# Patient Record
Sex: MALE | Race: BLACK OR AFRICAN AMERICAN | NOT HISPANIC OR LATINO | Employment: FULL TIME | URBAN - METROPOLITAN AREA
[De-identification: names, ages, dates, MRNs, and addresses within clinical notes are randomized per-mention and may not be internally consistent; named-entity substitution may affect disease eponyms.]

---

## 2024-02-08 ENCOUNTER — OFFICE VISIT (OUTPATIENT)
Dept: PRIMARY CARE | Facility: CLINIC | Age: 29
End: 2024-02-08
Payer: COMMERCIAL

## 2024-02-08 VITALS
DIASTOLIC BLOOD PRESSURE: 87 MMHG | BODY MASS INDEX: 39.52 KG/M2 | SYSTOLIC BLOOD PRESSURE: 127 MMHG | HEIGHT: 67 IN | OXYGEN SATURATION: 96 % | WEIGHT: 251.8 LBS | TEMPERATURE: 98.1 F | HEART RATE: 89 BPM

## 2024-02-08 DIAGNOSIS — Z11.59 NEED FOR HEPATITIS C SCREENING TEST: ICD-10-CM

## 2024-02-08 DIAGNOSIS — E78.2 HYPERLIPIDEMIA, MIXED: ICD-10-CM

## 2024-02-08 DIAGNOSIS — E55.9 HYPOVITAMINOSIS D: ICD-10-CM

## 2024-02-08 DIAGNOSIS — Z83.3 FAMILY HISTORY OF DIABETES MELLITUS: ICD-10-CM

## 2024-02-08 DIAGNOSIS — N52.9 ERECTILE DISORDER: ICD-10-CM

## 2024-02-08 DIAGNOSIS — Z00.00 HEALTHCARE MAINTENANCE: Primary | ICD-10-CM

## 2024-02-08 DIAGNOSIS — Z11.4 SCREENING FOR HIV (HUMAN IMMUNODEFICIENCY VIRUS): ICD-10-CM

## 2024-02-08 DIAGNOSIS — E66.09 CLASS 2 OBESITY DUE TO EXCESS CALORIES WITHOUT SERIOUS COMORBIDITY WITH BODY MASS INDEX (BMI) OF 39.0 TO 39.9 IN ADULT: ICD-10-CM

## 2024-02-08 PROBLEM — N46.11 OLIGOSPERMIA: Status: ACTIVE | Noted: 2022-01-18

## 2024-02-08 PROBLEM — R86.8 LOW VOLUME OF EJACULATED SEMEN: Status: ACTIVE | Noted: 2022-01-18

## 2024-02-08 LAB
25(OH)D3 SERPL-MCNC: 23 NG/ML (ref 30–100)
ALBUMIN SERPL BCP-MCNC: 4.3 G/DL (ref 3.4–5)
ALP SERPL-CCNC: 37 U/L (ref 33–120)
ALT SERPL W P-5'-P-CCNC: 25 U/L (ref 10–52)
ANION GAP SERPL CALC-SCNC: 11 MMOL/L (ref 10–20)
AST SERPL W P-5'-P-CCNC: 36 U/L (ref 9–39)
BILIRUB SERPL-MCNC: 0.6 MG/DL (ref 0–1.2)
BUN SERPL-MCNC: 13 MG/DL (ref 6–23)
CALCIUM SERPL-MCNC: 9.1 MG/DL (ref 8.6–10.6)
CHLORIDE SERPL-SCNC: 104 MMOL/L (ref 98–107)
CHOLEST SERPL-MCNC: 169 MG/DL (ref 0–199)
CHOLESTEROL/HDL RATIO: 5
CO2 SERPL-SCNC: 27 MMOL/L (ref 21–32)
CREAT SERPL-MCNC: 1.16 MG/DL (ref 0.5–1.3)
EGFRCR SERPLBLD CKD-EPI 2021: 88 ML/MIN/1.73M*2
EST. AVERAGE GLUCOSE BLD GHB EST-MCNC: 80 MG/DL
GLUCOSE SERPL-MCNC: 84 MG/DL (ref 74–99)
HBA1C MFR BLD: 4.4 %
HCV AB SER QL: NONREACTIVE
HDLC SERPL-MCNC: 33.5 MG/DL
HIV 1+2 AB+HIV1 P24 AG SERPL QL IA: NONREACTIVE
NON-HDL CHOLESTEROL: 136 MG/DL (ref 0–149)
POTASSIUM SERPL-SCNC: 4.2 MMOL/L (ref 3.5–5.3)
PROT SERPL-MCNC: 7.1 G/DL (ref 6.4–8.2)
SODIUM SERPL-SCNC: 138 MMOL/L (ref 136–145)

## 2024-02-08 PROCEDURE — 80053 COMPREHEN METABOLIC PANEL: CPT

## 2024-02-08 PROCEDURE — 83036 HEMOGLOBIN GLYCOSYLATED A1C: CPT

## 2024-02-08 PROCEDURE — 87389 HIV-1 AG W/HIV-1&-2 AB AG IA: CPT

## 2024-02-08 PROCEDURE — 86803 HEPATITIS C AB TEST: CPT

## 2024-02-08 PROCEDURE — 82465 ASSAY BLD/SERUM CHOLESTEROL: CPT

## 2024-02-08 PROCEDURE — 83718 ASSAY OF LIPOPROTEIN: CPT

## 2024-02-08 PROCEDURE — 99385 PREV VISIT NEW AGE 18-39: CPT | Performed by: INTERNAL MEDICINE

## 2024-02-08 PROCEDURE — 82306 VITAMIN D 25 HYDROXY: CPT

## 2024-02-08 PROCEDURE — 1036F TOBACCO NON-USER: CPT | Performed by: INTERNAL MEDICINE

## 2024-02-08 PROCEDURE — 3008F BODY MASS INDEX DOCD: CPT | Performed by: INTERNAL MEDICINE

## 2024-02-08 PROCEDURE — 99202 OFFICE O/P NEW SF 15 MIN: CPT | Performed by: INTERNAL MEDICINE

## 2024-02-08 PROCEDURE — 36415 COLL VENOUS BLD VENIPUNCTURE: CPT

## 2024-02-08 RX ORDER — TADALAFIL 10 MG/1
10 TABLET ORAL DAILY PRN
Qty: 30 TABLET | Refills: 6 | Status: SHIPPED | OUTPATIENT
Start: 2024-02-08 | End: 2025-02-07

## 2024-02-08 SDOH — ECONOMIC STABILITY: HOUSING INSECURITY
IN THE LAST 12 MONTHS, WAS THERE A TIME WHEN YOU DID NOT HAVE A STEADY PLACE TO SLEEP OR SLEPT IN A SHELTER (INCLUDING NOW)?: NO

## 2024-02-08 SDOH — ECONOMIC STABILITY: GENERAL
WHICH OF THE FOLLOWING DO YOU KNOW HOW TO USE AND HAVE ACCESS TO EVERY DAY? (CHOOSE ALL THAT APPLY): DESKTOP COMPUTER, LAPTOP COMPUTER, OR TABLET WITH BROADBAND INTERNET CONNECTION;SMARTPHONE WITH CELLULAR DATA PLAN

## 2024-02-08 SDOH — ECONOMIC STABILITY: FOOD INSECURITY: WITHIN THE PAST 12 MONTHS, THE FOOD YOU BOUGHT JUST DIDN'T LAST AND YOU DIDN'T HAVE MONEY TO GET MORE.: NEVER TRUE

## 2024-02-08 SDOH — ECONOMIC STABILITY: FOOD INSECURITY: WITHIN THE PAST 12 MONTHS, YOU WORRIED THAT YOUR FOOD WOULD RUN OUT BEFORE YOU GOT MONEY TO BUY MORE.: NEVER TRUE

## 2024-02-08 SDOH — ECONOMIC STABILITY: INCOME INSECURITY: IN THE LAST 12 MONTHS, WAS THERE A TIME WHEN YOU WERE NOT ABLE TO PAY THE MORTGAGE OR RENT ON TIME?: NO

## 2024-02-08 SDOH — HEALTH STABILITY: PHYSICAL HEALTH: ON AVERAGE, HOW MANY MINUTES DO YOU ENGAGE IN EXERCISE AT THIS LEVEL?: 60 MIN

## 2024-02-08 SDOH — HEALTH STABILITY: PHYSICAL HEALTH: ON AVERAGE, HOW MANY DAYS PER WEEK DO YOU ENGAGE IN MODERATE TO STRENUOUS EXERCISE (LIKE A BRISK WALK)?: 6 DAYS

## 2024-02-08 SDOH — ECONOMIC STABILITY: TRANSPORTATION INSECURITY
IN THE PAST 12 MONTHS, HAS THE LACK OF TRANSPORTATION KEPT YOU FROM MEDICAL APPOINTMENTS OR FROM GETTING MEDICATIONS?: NO

## 2024-02-08 SDOH — ECONOMIC STABILITY: TRANSPORTATION INSECURITY
IN THE PAST 12 MONTHS, HAS LACK OF TRANSPORTATION KEPT YOU FROM MEETINGS, WORK, OR FROM GETTING THINGS NEEDED FOR DAILY LIVING?: NO

## 2024-02-08 ASSESSMENT — ENCOUNTER SYMPTOMS
DEPRESSION: 0
NAUSEA: 0
ACTIVITY CHANGE: 0
SHORTNESS OF BREATH: 0
MYALGIAS: 0
PALPITATIONS: 0
OCCASIONAL FEELINGS OF UNSTEADINESS: 0
AGITATION: 0
LOSS OF SENSATION IN FEET: 0
CHEST TIGHTNESS: 0
WEAKNESS: 0
DIARRHEA: 0
SORE THROAT: 0
CHILLS: 0
SINUS PRESSURE: 0

## 2024-02-08 ASSESSMENT — SOCIAL DETERMINANTS OF HEALTH (SDOH)
WITHIN THE LAST YEAR, HAVE YOU BEEN KICKED, HIT, SLAPPED, OR OTHERWISE PHYSICALLY HURT BY YOUR PARTNER OR EX-PARTNER?: NO
IN THE PAST 12 MONTHS, HAS THE ELECTRIC, GAS, OIL, OR WATER COMPANY THREATENED TO SHUT OFF SERVICE IN YOUR HOME?: NO
WITHIN THE LAST YEAR, HAVE TO BEEN RAPED OR FORCED TO HAVE ANY KIND OF SEXUAL ACTIVITY BY YOUR PARTNER OR EX-PARTNER?: NO
HOW OFTEN DO YOU ATTEND CHURCH OR RELIGIOUS SERVICES?: MORE THAN 4 TIMES PER YEAR
HOW HARD IS IT FOR YOU TO PAY FOR THE VERY BASICS LIKE FOOD, HOUSING, MEDICAL CARE, AND HEATING?: NOT HARD AT ALL
WITHIN THE LAST YEAR, HAVE YOU BEEN HUMILIATED OR EMOTIONALLY ABUSED IN OTHER WAYS BY YOUR PARTNER OR EX-PARTNER?: NO
WITHIN THE LAST YEAR, HAVE YOU BEEN AFRAID OF YOUR PARTNER OR EX-PARTNER?: NO
HOW OFTEN DO YOU ATTENT MEETINGS OF THE CLUB OR ORGANIZATION YOU BELONG TO?: PATIENT DECLINED
DO YOU BELONG TO ANY CLUBS OR ORGANIZATIONS SUCH AS CHURCH GROUPS UNIONS, FRATERNAL OR ATHLETIC GROUPS, OR SCHOOL GROUPS?: PATIENT DECLINED
ARE YOU MARRIED, WIDOWED, DIVORCED, SEPARATED, NEVER MARRIED, OR LIVING WITH A PARTNER?: PATIENT DECLINED
HOW OFTEN DO YOU GET TOGETHER WITH FRIENDS OR RELATIVES?: MORE THAN THREE TIMES A WEEK
IN A TYPICAL WEEK, HOW MANY TIMES DO YOU TALK ON THE PHONE WITH FAMILY, FRIENDS, OR NEIGHBORS?: MORE THAN THREE TIMES A WEEK

## 2024-02-08 ASSESSMENT — ANXIETY QUESTIONNAIRES
7. FEELING AFRAID AS IF SOMETHING AWFUL MIGHT HAPPEN: NOT AT ALL
2. NOT BEING ABLE TO STOP OR CONTROL WORRYING: NOT AT ALL
1. FEELING NERVOUS, ANXIOUS, OR ON EDGE: NOT AT ALL
4. TROUBLE RELAXING: NOT AT ALL
3. WORRYING TOO MUCH ABOUT DIFFERENT THINGS: NOT AT ALL
6. BECOMING EASILY ANNOYED OR IRRITABLE: NOT AT ALL
GAD7 TOTAL SCORE: 0
5. BEING SO RESTLESS THAT IT IS HARD TO SIT STILL: NOT AT ALL
IF YOU CHECKED OFF ANY PROBLEMS ON THIS QUESTIONNAIRE, HOW DIFFICULT HAVE THESE PROBLEMS MADE IT FOR YOU TO DO YOUR WORK, TAKE CARE OF THINGS AT HOME, OR GET ALONG WITH OTHER PEOPLE: NOT DIFFICULT AT ALL

## 2024-02-08 ASSESSMENT — COLUMBIA-SUICIDE SEVERITY RATING SCALE - C-SSRS
2. HAVE YOU ACTUALLY HAD ANY THOUGHTS OF KILLING YOURSELF?: NO
1. IN THE PAST MONTH, HAVE YOU WISHED YOU WERE DEAD OR WISHED YOU COULD GO TO SLEEP AND NOT WAKE UP?: NO
6. HAVE YOU EVER DONE ANYTHING, STARTED TO DO ANYTHING, OR PREPARED TO DO ANYTHING TO END YOUR LIFE?: NO

## 2024-02-08 ASSESSMENT — PATIENT HEALTH QUESTIONNAIRE - PHQ9
SUM OF ALL RESPONSES TO PHQ9 QUESTIONS 1 & 2: 0
1. LITTLE INTEREST OR PLEASURE IN DOING THINGS: NOT AT ALL
2. FEELING DOWN, DEPRESSED OR HOPELESS: NOT AT ALL

## 2024-02-08 NOTE — PROGRESS NOTES
Primary care office Note- Dr. Ole Carrasquillo      Name: Flavio Valero, Age: 28 y.o., Gender: male, MRN: [id number]   Pharmacy:   [id number] [place] [facility] [address]  [address]  [place] [address]  Phone: [phone] Fax: [phone]    [id number] - [place], [place] - [address]  [address] [place] [id number]  Phone: [phone] Fax: [phone]     PCP: Ole Carrasqiullo        Subjective:     Chief Complaint   Patient presents with    Annual Exam       HPI:   Flavio Valero is a 28 y.o. male that presents for ongoing care.  The patient presents with his wife and essentially they came to discuss their concerns regarding infertility.  They have tried in vitro fertilization which was not successful.  They both report decreased volume of semen with ejaculation.  He reports having trouble having a normal ejaculation during sexual intercourse, but not with masturbation.  He and his wife are both 28 and want children but he reports increased anxiety and pressure related to conception.  The increased pressure to get pregnant is distracting him from enjoying sexual intercourse.  The reports some degree of loss of erection for ejaculation.  He has no problems with interest but asks if his testosterone should be checked.    ROS  Review of Systems   Constitutional:  Negative for activity change and chills.   HENT:  Negative for congestion, sinus pressure and sore throat.    Respiratory:  Negative for chest tightness and shortness of breath.    Cardiovascular:  Negative for chest pain and palpitations.   Gastrointestinal:  Negative for diarrhea and nausea.   Musculoskeletal:  Negative for myalgias.   Neurological:  Negative for weakness.   Psychiatric/Behavioral:  Negative for agitation and behavioral problems.         Medical History      PMH:    has no past medical history on file.   Allergies:   No Known Allergies   Surgical Hx:  "  History reviewed. No pertinent surgical history.   Social HX:   Social History     Tobacco Use    Smoking status: Never    Smokeless tobacco: Never   Vaping Use    Vaping Use: Never used   Substance Use Topics    Alcohol use: Yes     Alcohol/week: 4.0 standard drinks of alcohol     Types: 4 Shots of liquor per week    Drug use: Yes     Types: Marijuana        MEDS:   Current Outpatient Medications   Medication Instructions    tadalafil (CIALIS) 10 mg, oral, Daily PRN        Objective Data     Objective:   Visit Vitals  /87   Pulse 89   Temp 36.7 °C (98.1 °F)        Physical Examination:   Physical Exam  Constitutional:       Appearance: Normal appearance.   HENT:      Head: Normocephalic and atraumatic.      Nose: Nose normal.      Mouth/Throat:      Mouth: Mucous membranes are moist.   Eyes:      Extraocular Movements: Extraocular movements intact.      Pupils: Pupils are equal, round, and reactive to light.   Cardiovascular:      Rate and Rhythm: Normal rate and regular rhythm.   Pulmonary:      Effort: No respiratory distress.      Breath sounds: No wheezing.   Abdominal:      General: Bowel sounds are normal.      Palpations: Abdomen is soft.   Neurological:      General: No focal deficit present.          Last Labs:   CBC:   No results found for: \"WBC\", \"HGB\", \"MCV\", \"PLT\"   CMP:   Sodium   Date Value Ref Range Status   02/08/2024 138 136 - 145 mmol/L Final     Potassium   Date Value Ref Range Status   02/08/2024 4.2 3.5 - 5.3 mmol/L Final     Chloride   Date Value Ref Range Status   02/08/2024 104 98 - 107 mmol/L Final     Urea Nitrogen   Date Value Ref Range Status   02/08/2024 13 6 - 23 mg/dL Final     Creatinine   Date Value Ref Range Status   02/08/2024 1.16 0.50 - 1.30 mg/dL Final     eGFR   Date Value Ref Range Status   02/08/2024 88 >60 mL/min/1.73m*2 Final     Comment:     Calculations of estimated GFR are performed using the 2021 CKD-EPI Study Refit equation without the race variable for the " "IDMS-Traceable creatinine methods.  https://jasn.asnjournals.org/content/early/2021/09/22/ASN.3280966099      A1c:   Hemoglobin A1C   Date Value Ref Range Status   02/08/2024 4.4 see below % Final        Other labs;   Vit D:   Vitamin D, 25-Hydroxy, Total   Date Value Ref Range Status   02/08/2024 23 (L) 30 - 100 ng/mL Final      TSH:  No results found for: \"TSH\"     Assessment and Plan     Problem List Items Addressed This Visit       Family history of diabetes mellitus    Healthcare maintenance - Primary     Other Visit Diagnoses       Need for hepatitis C screening test        Relevant Orders    Hepatitis C Antibody (Completed)    Hyperlipidemia, mixed        Relevant Orders    Lipid Panel Non-Fasting (Completed)    Comprehensive Metabolic Panel (Completed)    Hypovitaminosis D        Relevant Orders    Vitamin D 25-Hydroxy,Total (for eval of Vitamin D levels) (Completed)    Class 2 obesity due to excess calories without serious comorbidity with body mass index (BMI) of 39.0 to 39.9 in adult        Relevant Orders    Hemoglobin A1C (Completed)    Erectile disorder        Relevant Medications    tadalafil (Cialis) 10 mg tablet        We talked in great detail about the dynamics of the situation.  Fact that they are both young and have at least 10 years in which to conceive a child.  I believe the pressure of trying to conceive is interfering with conception itself.  We discussed this dynamic and Mr. Valero agree.  His wife will try to take some of the pressure off but not focusing so much on conception.  Beyond that, he should see a urologist for testosterone testing and other advice regarding conception.  We will screen him and support as needed.    Ole Carrasquillo MD      "

## 2024-02-10 PROBLEM — N52.9 ERECTILE DISORDER: Status: ACTIVE | Noted: 2024-02-10

## 2024-04-29 ENCOUNTER — OFFICE VISIT (OUTPATIENT)
Dept: PRIMARY CARE | Facility: HOSPITAL | Age: 29
End: 2024-04-29
Payer: COMMERCIAL

## 2024-04-29 VITALS
HEART RATE: 82 BPM | SYSTOLIC BLOOD PRESSURE: 138 MMHG | BODY MASS INDEX: 39.55 KG/M2 | HEIGHT: 67 IN | TEMPERATURE: 97.3 F | DIASTOLIC BLOOD PRESSURE: 78 MMHG | RESPIRATION RATE: 18 BRPM | OXYGEN SATURATION: 97 % | WEIGHT: 252 LBS

## 2024-04-29 DIAGNOSIS — M25.561 ACUTE PAIN OF RIGHT KNEE: Primary | ICD-10-CM

## 2024-04-29 PROCEDURE — 99212 OFFICE O/P EST SF 10 MIN: CPT | Performed by: INTERNAL MEDICINE

## 2024-04-29 PROCEDURE — 1036F TOBACCO NON-USER: CPT | Performed by: INTERNAL MEDICINE

## 2024-04-29 PROCEDURE — 3008F BODY MASS INDEX DOCD: CPT | Performed by: INTERNAL MEDICINE

## 2024-04-29 ASSESSMENT — PATIENT HEALTH QUESTIONNAIRE - PHQ9
2. FEELING DOWN, DEPRESSED OR HOPELESS: NOT AT ALL
SUM OF ALL RESPONSES TO PHQ9 QUESTIONS 1 AND 2: 0
1. LITTLE INTEREST OR PLEASURE IN DOING THINGS: NOT AT ALL

## 2024-04-29 ASSESSMENT — ENCOUNTER SYMPTOMS
OCCASIONAL FEELINGS OF UNSTEADINESS: 0
DEPRESSION: 0
ACTIVITY CHANGE: 0
CHILLS: 0
DIARRHEA: 0
AGITATION: 0
MYALGIAS: 0
PALPITATIONS: 0
SINUS PRESSURE: 0
LOSS OF SENSATION IN FEET: 0
SORE THROAT: 0
WEAKNESS: 0
CHEST TIGHTNESS: 0
SHORTNESS OF BREATH: 0
NAUSEA: 0

## 2024-04-29 ASSESSMENT — COLUMBIA-SUICIDE SEVERITY RATING SCALE - C-SSRS
2. HAVE YOU ACTUALLY HAD ANY THOUGHTS OF KILLING YOURSELF?: NO
6. HAVE YOU EVER DONE ANYTHING, STARTED TO DO ANYTHING, OR PREPARED TO DO ANYTHING TO END YOUR LIFE?: NO
1. IN THE PAST MONTH, HAVE YOU WISHED YOU WERE DEAD OR WISHED YOU COULD GO TO SLEEP AND NOT WAKE UP?: NO

## 2024-04-29 ASSESSMENT — PAIN SCALES - GENERAL: PAINLEVEL: 5
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
## 2024-05-17 ENCOUNTER — APPOINTMENT (OUTPATIENT)
Dept: RADIOLOGY | Facility: HOSPITAL | Age: 29
End: 2024-05-17
Payer: COMMERCIAL

## 2024-11-10 NOTE — PROGRESS NOTES
"Last visit  - Semen analysis with strict morphology x 2 (through [facility])   -Fertility Panel: serum 17-Hydroxyprogesterone, serum Estradiol, FSH + LH, serum Prolactin, Testosterone free + total   -A1c  #Delayed Orgasm / ejaculatory dysfunction (only can orgasm during masturbation)  -fu with Dr. Winchester.    fu in 1 month.     Today's visit:  Partner/wife name: Bren Dodson  Partner/wife age / :   Attempting Pregnancy for:   Previous pregnancies for either partner: None.      Previous Semen analysis / Labs: SA in .   IVF in   -Partner had 18 eggs pulled, all of them fertilized. Of these eggs, 8 were regular. Only 5 survived, 2 were aneuploid and 1 transferred.   -Sperm testing done through [facility].      PREVIOUS RISK FACTORS   Coital Frequency:   Coital Lubricants: None   Problems with erections or ejaculation:   -Ejaculates through masturbation, not through sex.   Smoker no.   Previous Testosterone or anabolic steroid Use: none   -Family hx of diabetes.      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     Labs  No results found for: \"TESTOSTERONE\"  No results found for: \"LH\"  No results found for: \"FSH\"  No components found for: \"ESTRADIAL\"  No results found for: \"PSA\"  No components found for: \"CBC\"  No results found for: \"PROLACTIN\"  Lab Results   Component Value Date    HGBA1C 4.4 2024     No components found for: \"HEMATOCRIT\"      Medications:    Current Outpatient Medications:     tadalafil (Cialis) 10 mg tablet, Take 1 tablet (10 mg) by mouth once daily as needed for erectile dysfunction., Disp: 30 tablet, Rfl: 6    Allergy:  No Known Allergies     Exam  CONSTITUTIONAL:        No acute distress    HEAD:        Normocephalic and atraumatic    CHEST / RESPIRATORY      no excess work of breathing, no respiratory distress,    ABDOMEN / GASTROINTESTINAL:        Abdomen nondistended          Assessment/Plan    "

## 2024-11-11 ENCOUNTER — APPOINTMENT (OUTPATIENT)
Dept: UROLOGY | Facility: HOSPITAL | Age: 29
End: 2024-11-11
Payer: COMMERCIAL

## 2024-12-23 ENCOUNTER — APPOINTMENT (OUTPATIENT)
Dept: UROLOGY | Facility: HOSPITAL | Age: 29
End: 2024-12-23
Payer: COMMERCIAL

## 2025-01-30 ENCOUNTER — APPOINTMENT (OUTPATIENT)
Dept: ENDOCRINOLOGY | Facility: CLINIC | Age: 30
End: 2025-01-30
Payer: COMMERCIAL

## 2025-02-16 NOTE — PROGRESS NOTES
"Last visit 23  - Semen analysis with strict morphology x 2 (through [facility])   -Fertility Panel: serum 17-Hydroxyprogesterone, serum Estradiol, FSH + LH, serum Prolactin, Testosterone free + total   -A1c  #Delayed Orgasm / ejaculatory dysfunction (only can orgasm during masturbation)  -fu with Dr. Winchester.      favian in 1 month.    Today's visit:    29 year old male referred to me by self for infertility      Partner/wife name: Bren Dodson  Partner/wife age / :   Attempting Pregnancy for:   Previous pregnancies for either partner: None.         Previous Semen analysis / Labs: SA in .   IVF in   -Partner had 18 eggs pulled, all of them fertilized. Of these eggs, 8 were regular. Only 5 survived, 2 were aneuploid and 1 transferred.   -Sperm testing done through [facility].      PREVIOUS RISK FACTORS   None  -Ejaculates through masturbation, not through sex.   -Family hx of diabetes.      Labs  No results found for: \"TESTOSTERONE\"  No results found for: \"LH\"  No results found for: \"FSH\"  No components found for: \"ESTRADIAL\"  No results found for: \"PSA\"  No components found for: \"CBC\"  No results found for: \"PROLACTIN\"  Lab Results   Component Value Date    HGBA1C 4.4 2024     No components found for: \"HEMATOCRIT\"      Medications:    Current Outpatient Medications:     tadalafil (Cialis) 10 mg tablet, Take 1 tablet (10 mg) by mouth once daily as needed for erectile dysfunction., Disp: 30 tablet, Rfl: 6    Allergy:  No Known Allergies     Exam  CONSTITUTIONAL:        No acute distress    HEAD:        Normocephalic and atraumatic    CHEST / RESPIRATORY      no excess work of breathing, no respiratory distress,    ABDOMEN / GASTROINTESTINAL:        Abdomen nondistended          Assessment/Plan  I counseled the patient in detail regarding infertility, specifically the male component. We reviewed different causes of male infertility as well as options to optimize male fertility, to different surgical interventions " and assisted reproductive technologies.   - Semen analysis with strict morphology x 2 (through [facility])   -Fertility Panel: serum 17-Hydroxyprogesterone, serum Estradiol, FSH + LH, serum Prolactin, Testosterone free + total   -A1c     #Delayed Orgasm / ejaculatory dysfunction (only can orgasm during masturbation)  -fu with Dr. Winchester.      favian in 1 month.

## 2025-02-17 ENCOUNTER — APPOINTMENT (OUTPATIENT)
Dept: UROLOGY | Facility: HOSPITAL | Age: 30
End: 2025-02-17
Payer: COMMERCIAL

## 2025-02-18 ENCOUNTER — APPOINTMENT (OUTPATIENT)
Dept: PRIMARY CARE | Facility: CLINIC | Age: 30
End: 2025-02-18
Payer: COMMERCIAL

## 2025-02-20 ENCOUNTER — APPOINTMENT (OUTPATIENT)
Dept: PRIMARY CARE | Facility: CLINIC | Age: 30
End: 2025-02-20
Payer: COMMERCIAL

## 2025-04-10 ENCOUNTER — APPOINTMENT (OUTPATIENT)
Dept: ENDOCRINOLOGY | Facility: CLINIC | Age: 30
End: 2025-04-10
Payer: COMMERCIAL

## 2025-06-26 ENCOUNTER — APPOINTMENT (OUTPATIENT)
Dept: PRIMARY CARE | Facility: CLINIC | Age: 30
End: 2025-06-26
Payer: COMMERCIAL

## 2025-08-14 ENCOUNTER — APPOINTMENT (OUTPATIENT)
Dept: ENDOCRINOLOGY | Facility: CLINIC | Age: 30
End: 2025-08-14
Payer: COMMERCIAL